# Patient Record
(demographics unavailable — no encounter records)

---

## 2025-02-12 NOTE — HISTORY OF PRESENT ILLNESS
[FreeTextEntry1] : CPE Establish care [de-identified] : This is a morbidly obese 39-year-old male with no significant past medical history presenting for an annual physical.  He has not been seen by a physician since high school.  He has concerns about his weight and his overall health however he denies any acute complaints.

## 2025-02-12 NOTE — PHYSICAL EXAM
[No Acute Distress] : no acute distress [Well-Appearing] : well-appearing [EOMI] : extraocular movements intact [Supple] : supple [Clear to Auscultation] : lungs were clear to auscultation bilaterally [No Edema] : there was no peripheral edema [Soft] : abdomen soft [Non Tender] : non-tender [Grossly Normal Strength/Tone] : grossly normal strength/tone [No Rash] : no rash [No Focal Deficits] : no focal deficits [Normal Affect] : the affect was normal [Normal Insight/Judgement] : insight and judgment were intact [de-identified] : Morbidly obese [de-identified] : Distant heart sounds secondary to body habitus

## 2025-02-19 NOTE — ASSESSMENT
[FreeTextEntry1] : I spent a total of 30 mins on the care of this patient on date of service including reviewing records, obtaining a history and conducting a physical examination; as well as counseling and educating the patient.

## 2025-02-19 NOTE — HISTORY OF PRESENT ILLNESS
[FreeTextEntry1] : Follow-up [de-identified] : Devyn is a 39-year-old male presenting for follow-up after being diagnosed with hypertension at his last visit.  He was started on amlodipine.  He seems to be tolerating well.  His labs also revealed that he has dyslipidemia as well as prediabetes.  He would be interested in starting a GLP-1 agonist.  He denies any family history of thyroid cancers.  He does report that his father  of colon cancer in his 30s.  He has not had a colon cancer screening himself.